# Patient Record
Sex: FEMALE | Race: BLACK OR AFRICAN AMERICAN | NOT HISPANIC OR LATINO
[De-identification: names, ages, dates, MRNs, and addresses within clinical notes are randomized per-mention and may not be internally consistent; named-entity substitution may affect disease eponyms.]

---

## 2022-10-03 ENCOUNTER — RESULT REVIEW (OUTPATIENT)
Age: 47
End: 2022-10-03

## 2022-10-14 PROBLEM — Z00.00 ENCOUNTER FOR PREVENTIVE HEALTH EXAMINATION: Status: ACTIVE | Noted: 2022-10-14

## 2022-10-18 ENCOUNTER — NON-APPOINTMENT (OUTPATIENT)
Age: 47
End: 2022-10-18

## 2022-11-02 ENCOUNTER — APPOINTMENT (OUTPATIENT)
Dept: BREAST CENTER | Facility: CLINIC | Age: 47
End: 2022-11-02

## 2022-11-02 DIAGNOSIS — Z78.9 OTHER SPECIFIED HEALTH STATUS: ICD-10-CM

## 2022-11-02 DIAGNOSIS — Z91.89 OTHER SPECIFIED PERSONAL RISK FACTORS, NOT ELSEWHERE CLASSIFIED: ICD-10-CM

## 2022-11-02 PROCEDURE — 99205 OFFICE O/P NEW HI 60 MIN: CPT

## 2022-11-02 RX ORDER — AMLODIPINE BESYLATE 5 MG/1
TABLET ORAL
Refills: 0 | Status: ACTIVE | COMMUNITY

## 2022-11-03 NOTE — PAST MEDICAL HISTORY
[Menstruating] : The patient is menstruating [Menarche Age ____] : age at menarche was [unfilled] [Definite ___ (Date)] : the last menstrual period was [unfilled] [Regular Cycle Intervals] : have been regular [Total Preg ___] : G[unfilled] [Live Births ___] : P[unfilled]  [Abortions ___] : Abortions:[unfilled] [Age At Live Birth ___] : Age at live birth: [unfilled] [History of Hormone Replacement Treatment] : has no history of hormone replacement treatment [FreeTextEntry5] : yes [FreeTextEntry6] : no [FreeTextEntry7] : no [FreeTextEntry8] : no

## 2022-11-03 NOTE — PHYSICAL EXAM
[Normocephalic] : normocephalic [EOMI] : extra ocular movement intact [Supple] : supple [No Supraclavicular Adenopathy] : no supraclavicular adenopathy [No Cervical Adenopathy] : no cervical adenopathy [de-identified] : Bilateral breast/axilla/supraclavicular area: No masses, discharge, or adenopathy (R slightly larger than L)

## 2022-11-03 NOTE — HISTORY OF PRESENT ILLNESS
[FreeTextEntry1] : Patient is a 46yo F who presents today for initial evaluation of B/l bx proven FA and R stereo bx radial scar x2 found on her annual screening imaging. Denies history of any breast surgeries or any breast biopsies before 2022. Denies family history of breast or ovarian cancer. Patient denies palpable masses, skin changes, or nipple discharge bilaterally.\par \par \par 9/9/22: B/l MG & US- scattered fibroglandular. R questioned mass central inner 5FN.  questioned distortion inner breast 6FN. R questioned mass LOQ 11FN. L questioned mass UOQ 19FN. Rec B/l MG/US. BI-RADS 0\par 9/22/22: B/l MG & US- scattered fibroglandular. R lobulated mass central breast, anterior depth. R ovoid mass LOQ, middle depth. R architectural distortion UIQ, middle depth (rec stereo bx). R architectural distortion central inner, middle to anterior depth (rec stereo bx). L lobulated mass UOQ, posterior depth. US- R 0.6cm hypoechoic mass 12:00 5FN (c/w mass central breast- rec US bx) . R 0.6cm benign-appearing mildly complicated cyst 12:00 6FN. R 1.5cm benign-appearing mildly dilated duct 12:00 6FN. R 1.6cm hypoechoic mass 8:00 11FN (c/w MG mass LOQ- rec US bx). L 1.1cm hypoechoic mass 2:00 19FN (c/w mass UOQ- rec US bx). Rec R stereo bx x2, R US bx x2, and L US bx. BI-RADS 4\par 9/29/22: B/l US bx\par SITE 1) R 0.7cm mass 12:00 5FN (venus clip)- FA. Benign and concordant\par SITE 2) R 1.7cm mass 8:00 1FN (ribbon clip)- FA. Benign and concordant\par SITE 3) L 1.0cm mass 2:00 19FN (heart clip)- FA. Benign and concordant\par 10/3/22: R stereo bx x2- \par SITE 1) R architectural distortion 1:00 anterior (hourglass clip)- radial sclerosing papillary lesion. High risk and concordant. *located inferior to the site of architectural distortion\par SITE 2) R architectural distortion 1:00 posterior (top hat clip)- radial sclerosing papillary lesion. High risk and concordant

## 2022-11-16 ENCOUNTER — NON-APPOINTMENT (OUTPATIENT)
Age: 47
End: 2022-11-16

## 2022-11-29 ENCOUNTER — APPOINTMENT (OUTPATIENT)
Dept: PLASTIC SURGERY | Facility: CLINIC | Age: 47
End: 2022-11-29

## 2022-11-29 ENCOUNTER — NON-APPOINTMENT (OUTPATIENT)
Age: 47
End: 2022-11-29

## 2022-11-29 DIAGNOSIS — N62 HYPERTROPHY OF BREAST: ICD-10-CM

## 2022-11-29 DIAGNOSIS — Z87.2 PERSONAL HISTORY OF DISEASES OF THE SKIN AND SUBCUTANEOUS TISSUE: ICD-10-CM

## 2022-11-29 DIAGNOSIS — Z78.9 OTHER SPECIFIED HEALTH STATUS: ICD-10-CM

## 2022-11-29 DIAGNOSIS — N64.89 OTHER SPECIFIED DISORDERS OF BREAST: ICD-10-CM

## 2022-11-29 PROCEDURE — 99204 OFFICE O/P NEW MOD 45 MIN: CPT

## 2022-11-29 RX ORDER — AMLODIPINE BESYLATE 10 MG/1
10 TABLET ORAL
Qty: 30 | Refills: 0 | Status: ACTIVE | COMMUNITY
Start: 2022-09-06

## 2022-12-08 VITALS — HEIGHT: 64 IN | WEIGHT: 208 LBS | BODY MASS INDEX: 35.51 KG/M2

## 2022-12-09 ENCOUNTER — NON-APPOINTMENT (OUTPATIENT)
Age: 47
End: 2022-12-09

## 2022-12-12 ENCOUNTER — NON-APPOINTMENT (OUTPATIENT)
Age: 47
End: 2022-12-12

## 2022-12-12 NOTE — ASSESSMENT
[FreeTextEntry1] : I reviewed with the patient in detail the risks, benefits, and alternatives of bilateral reduction mammoplasty. I explained to her that the goals of the operation are to reduce the size of the breast mounds and to tighten the skin envelope and raise the nipple areola complex. I explained the scar burden associated with this operation and I showed her pictures. I explained the risk of bleeding, infection, delayed wound healing, residual asymmetry, decreased sensation to the nipple areola complex, loss of the nipple areola complex, suboptimal scarring and need for revision surgery. The surgery is approximately 3.0 hours and the recovery is approximately 2 weeks - avoiding any strenuous physical activity with 1-2 weeks out of work.\par \par Becasue she does not have cancer diagnosis or documented history of symptomatic macromastia that has failed conservative treatment it may not be covered by insurance. Additionaly she has hx of keloid as well as hypertrophic scarring and would have a risk of similar scars on her breast. At this time she does not wish to proceed with combined surgery - She can return in the future if she wants to revisit - but the risk of scarring is concerning. \par

## 2022-12-12 NOTE — HISTORY OF PRESENT ILLNESS
[FreeTextEntry1] : 47 year old female referred by Dr. Choco Moya, recently diagnosed with radial scar x 2 with plan for excision. She would like to have simultaneous breast reduction at the excision. Current bra size is approximately 38DDD. She Denies neck/back/shoulder pain. She has history of severe Keloid scarringalong her Pfennesteil incision\par \par No personal or family history of blood clots, DVTs, bleeding or clotting disorders.\par

## 2022-12-12 NOTE — PHYSICAL EXAM
[de-identified] : b/l pendulous breasts, macromastia, no scar, no masses, no nipple discharge. SN - N: L 34 cm  R 34.5 cm, BD: 19 cm b/l, grade II/III ptosis, widened horizontal hypertrophic scar left chest upper quadrant\par  [de-identified] : Large Keloid along previous Pfannenstiel incision

## 2022-12-13 ENCOUNTER — APPOINTMENT (OUTPATIENT)
Dept: BREAST CENTER | Facility: AMBULATORY SURGERY CENTER | Age: 47
End: 2022-12-13

## 2022-12-21 ENCOUNTER — APPOINTMENT (OUTPATIENT)
Dept: BREAST CENTER | Facility: CLINIC | Age: 47
End: 2022-12-21

## 2022-12-22 ENCOUNTER — NON-APPOINTMENT (OUTPATIENT)
Age: 47
End: 2022-12-22